# Patient Record
Sex: FEMALE | Race: WHITE | NOT HISPANIC OR LATINO | Employment: FULL TIME | URBAN - METROPOLITAN AREA
[De-identification: names, ages, dates, MRNs, and addresses within clinical notes are randomized per-mention and may not be internally consistent; named-entity substitution may affect disease eponyms.]

---

## 2017-01-24 ENCOUNTER — PATIENT MESSAGE (OUTPATIENT)
Dept: OBSTETRICS AND GYNECOLOGY | Facility: CLINIC | Age: 27
End: 2017-01-24

## 2017-01-25 ENCOUNTER — TELEPHONE (OUTPATIENT)
Dept: OBSTETRICS AND GYNECOLOGY | Facility: CLINIC | Age: 27
End: 2017-01-25

## 2017-01-25 NOTE — TELEPHONE ENCOUNTER
----- Message from Rosy Borja sent at 1/25/2017  9:49 AM CST -----  Contact: MIGUE WOODY [[id number]]  _x  1st Request  _  2nd Request  _  3rd Request        Who: MIGUE WOODY [[id number]]    Why: patient states she would like to schedule an appt for the vulva clinic for Tuesday    What Number to Call Back: [phone]    When to Expect a call back: (Before the end of the day)   -- if call after 3:00 call back will be tomorrow.

## 2017-02-02 ENCOUNTER — OFFICE VISIT (OUTPATIENT)
Dept: OBSTETRICS AND GYNECOLOGY | Facility: CLINIC | Age: 27
End: 2017-02-02
Attending: OBSTETRICS & GYNECOLOGY
Payer: COMMERCIAL

## 2017-02-02 VITALS
SYSTOLIC BLOOD PRESSURE: 110 MMHG | HEIGHT: 65 IN | DIASTOLIC BLOOD PRESSURE: 78 MMHG | WEIGHT: 146.38 LBS | BODY MASS INDEX: 24.39 KG/M2

## 2017-02-02 DIAGNOSIS — N89.8 VAGINAL DISCHARGE: ICD-10-CM

## 2017-02-02 DIAGNOSIS — Z12.4 SCREENING FOR CERVICAL CANCER: Primary | ICD-10-CM

## 2017-02-02 DIAGNOSIS — Z01.419 ENCOUNTER FOR GYNECOLOGICAL EXAMINATION WITHOUT ABNORMAL FINDING: ICD-10-CM

## 2017-02-02 LAB
CANDIDA RRNA VAG QL PROBE: POSITIVE
G VAGINALIS RRNA GENITAL QL PROBE: NEGATIVE
T VAGINALIS RRNA GENITAL QL PROBE: NEGATIVE

## 2017-02-02 PROCEDURE — 87480 CANDIDA DNA DIR PROBE: CPT

## 2017-02-02 PROCEDURE — 99999 PR PBB SHADOW E&M-EST. PATIENT-LVL II: CPT | Mod: PBBFAC,,, | Performed by: OBSTETRICS & GYNECOLOGY

## 2017-02-02 PROCEDURE — 99385 PREV VISIT NEW AGE 18-39: CPT | Mod: S$GLB,,, | Performed by: OBSTETRICS & GYNECOLOGY

## 2017-02-02 PROCEDURE — 88175 CYTOPATH C/V AUTO FLUID REDO: CPT

## 2017-02-02 RX ORDER — SERTRALINE HYDROCHLORIDE 100 MG/1
TABLET, FILM COATED ORAL
COMMUNITY
Start: 2016-12-16 | End: 2017-02-14 | Stop reason: SDUPTHER

## 2017-02-02 NOTE — PROGRESS NOTES
SUBJECTIVE:   26 y.o. female   for annual routine Pap and checkup. Patient's last menstrual period was 2017..  She complains of possible yeast infection. She is seeing Dr. Yogi SHIRLEY for vulvovestibulitis.        History reviewed. No pertinent past medical history.  History reviewed. No pertinent past surgical history.  Social History     Social History    Marital status: Single     Spouse name: N/A    Number of children: N/A    Years of education: N/A     Occupational History    Not on file.     Social History Main Topics    Smoking status: Never Smoker    Smokeless tobacco: Not on file    Alcohol use Yes      Comment: socially    Drug use: No    Sexual activity: Not Currently     Partners: Male     Birth control/ protection: None     Other Topics Concern    Not on file     Social History Narrative     Family History   Problem Relation Age of Onset    Ovarian cancer Paternal Grandmother     Breast cancer Maternal Grandmother     No Known Problems Father     No Known Problems Mother      OB History    Para Term  AB SAB TAB Ectopic Multiple Living   0 0 0 0 0 0 0 0 0 0               Current Outpatient Prescriptions   Medication Sig Dispense Refill    esomeprazole (NEXIUM) 20 MG capsule Take 20 mg by mouth before breakfast.      lorazepam (ATIVAN) 1 MG tablet Take 1 mg by mouth every 6 (six) hours as needed for Anxiety.      sertraline (ZOLOFT) 50 MG tablet Take 75 mg by mouth once daily.       sertraline (ZOLOFT) 100 MG tablet        No current facility-administered medications for this visit.      Allergies: Review of patient's allergies indicates no known allergies.     ROS:  Constitutional: no weight loss, weight gain, fever, fatigue  Eyes:  No vision changes, glasses/contacts  ENT/Mouth: No ulcers, sinus problems, ears ringing, headache  Cardiovascular: No inability to lie flat, chest pain, exercise intolerance, swelling, heart palpitations  Respiratory: No wheezing,  coughing blood, shortness of breath, or cough  Gastrointestinal: No diarrhea, bloody stool, nausea/vomiting, constipation, gas, hemorrhoids  Genitourinary: No blood in urine, painful urination, urgency of urination, frequency of urination, incomplete emptying, incontinence, abnormal bleeding, painful periods, heavy periods, vaginal discharge, vaginal odor, painful intercourse, sexual problems, bleeding after intercourse.  Musculoskeletal: No muscle weakness  Skin/Breast: No painful breasts, nipple discharge, masses, rash, ulcers  Neurological: No passing out, seizures, numbness, headache  Endocrine: No diabetes, hypothyroid, hyperthyroid, hot flashes, hair loss, abnormal hair growth, acne  Psychiatric: No depression, crying  Hematologic: No bruises, bleeding, swollen lymph nodes, anemia.      Physical Exam:   Constitutional: She is oriented to person, place, and time. She appears well-developed and well-nourished.      Neck: Normal range of motion. No tracheal deviation present. No thyromegaly present.    Cardiovascular: Exam reveals no edema.     Pulmonary/Chest: Effort normal. She exhibits no mass, no tenderness, no deformity and no retraction. Right breast exhibits no inverted nipple, no mass, no nipple discharge, no skin change, no tenderness, presence, no bleeding and no swelling. Left breast exhibits no inverted nipple, no mass, no nipple discharge, no skin change, no tenderness, presence, no bleeding and no swelling. Breasts are symmetrical.        Abdominal: Soft. She exhibits no distension and no mass. There is no tenderness. There is no rebound and no guarding. No hernia. Hernia confirmed negative in the left inguinal area.     Genitourinary: Vagina normal and uterus normal. Rectal exam shows no external hemorrhoid. There is no rash, tenderness or lesion on the right labia. There is no rash, tenderness or lesion on the left labia. Uterus is not deviated. Cervix is normal. No no adexnal prolapse. Right  adnexum displays no mass, no tenderness and no fullness. Left adnexum displays no mass, no tenderness and no fullness. No tenderness, bleeding, rectocele, cystocele or unspecified prolapse of vaginal walls in the vagina. No vaginal discharge found. Cervix exhibits no motion tenderness, no discharge and no friability.           Musculoskeletal: Normal range of motion and moves all extremeties. She exhibits no edema.      Lymphadenopathy:        Right: No inguinal adenopathy present.        Left: No inguinal adenopathy present.    Neurological: She is alert and oriented to person, place, and time.    Skin: No rash noted. No erythema. No pallor.    Psychiatric: She has a normal mood and affect. Her behavior is normal. Judgment and thought content normal.         ASSESSMENT:   well woman  Vaginal discharge    PLAN:   pap smear  additional lab tests per orders  return annually or prn  Patient's family history is suggestive of hereditary breast cancer secondary to a BRCA1/2 mutation. Options for genetic testing discussed in detail including possible test results, implications for self and family, and insurance issues. Patient to notify me if she desires genetic testing

## 2017-02-02 NOTE — MR AVS SNAPSHOT
"    [facility] [address]  [address]  [place] [address]  Phone: [phone]  Fax: [phone]                  Steffanie Payne   2017 8:30 AM   Office Visit    Description:  Female : 1990   Provider:  Shabana Cooper MD   Department:  [facility] [address]           Reason for Visit     Annual Exam                To Do List           Future Appointments        Provider Department Dept Phone    2017 4:00 PM Philip Calix III, MD [facility] [address] [phone]      Goals (5 Years of Data)     None      [facility]     [facility] -  Assistance  Registered nurses in the [facility] Center provide clinical advisement, health education, appointment booking, and other advisory services.  Call for this free service at [phone].             Medications           Message regarding Medications     Verify the changes and/or additions to your medication regime listed below are the same as discussed with your clinician today.  If any of these changes or additions are incorrect, please notify your healthcare provider.             Verify that the below list of medications is an accurate representation of the medications you are currently taking.  If none reported, the list may be blank. If incorrect, please contact your healthcare provider. Carry this list with you in case of emergency.           Current Medications     esomeprazole (NEXIUM) 20 MG capsule Take 20 mg by mouth before breakfast.    lorazepam (ATIVAN) 1 MG tablet Take 1 mg by mouth every 6 (six) hours as needed for Anxiety.    sertraline (ZOLOFT) 50 MG tablet Take 75 mg by mouth once daily.     sertraline (ZOLOFT) 100 MG tablet            Clinical Reference Information           Vital Signs - Last Recorded  Most recent update: 2017  8:39 AM by Miriam Best MA    BP Ht Wt LMP BMI    110/78 5' 5" (1.651 m) 66.4 kg (146 lb 6.2 oz) 2017 24.36 kg/m2    "   Blood Pressure          Most Recent Value    BP  110/78      Allergies as of 2/2/2017     No Known Allergies      Immunizations Administered on Date of Encounter - 2/2/2017     None

## 2017-02-03 ENCOUNTER — TELEPHONE (OUTPATIENT)
Dept: OBSTETRICS AND GYNECOLOGY | Facility: CLINIC | Age: 27
End: 2017-02-03

## 2017-02-03 RX ORDER — TERCONAZOLE 4 MG/G
1 CREAM VAGINAL DAILY
Qty: 45 G | Refills: 1 | Status: SHIPPED | OUTPATIENT
Start: 2017-02-03 | End: 2017-02-14 | Stop reason: ALTCHOICE

## 2017-02-14 ENCOUNTER — OFFICE VISIT (OUTPATIENT)
Dept: OBSTETRICS AND GYNECOLOGY | Facility: CLINIC | Age: 27
End: 2017-02-14
Attending: OBSTETRICS & GYNECOLOGY
Payer: COMMERCIAL

## 2017-02-14 ENCOUNTER — PATIENT MESSAGE (OUTPATIENT)
Dept: OBSTETRICS AND GYNECOLOGY | Facility: CLINIC | Age: 27
End: 2017-02-14

## 2017-02-14 VITALS
SYSTOLIC BLOOD PRESSURE: 112 MMHG | BODY MASS INDEX: 24.24 KG/M2 | WEIGHT: 145.5 LBS | HEIGHT: 65 IN | DIASTOLIC BLOOD PRESSURE: 70 MMHG

## 2017-02-14 DIAGNOSIS — B37.31 CANDIDIASIS OF VULVA AND VAGINA: ICD-10-CM

## 2017-02-14 DIAGNOSIS — N94.810 VESTIBULITIS, VULVAR: Primary | ICD-10-CM

## 2017-02-14 DIAGNOSIS — N94.10 DYSPAREUNIA IN FEMALE: ICD-10-CM

## 2017-02-14 PROCEDURE — 87210 SMEAR WET MOUNT SALINE/INK: CPT | Mod: QW,S$GLB,, | Performed by: OBSTETRICS & GYNECOLOGY

## 2017-02-14 PROCEDURE — 99214 OFFICE O/P EST MOD 30 MIN: CPT | Mod: S$GLB,,, | Performed by: OBSTETRICS & GYNECOLOGY

## 2017-02-14 PROCEDURE — 99999 PR PBB SHADOW E&M-EST. PATIENT-LVL III: CPT | Mod: PBBFAC,,, | Performed by: OBSTETRICS & GYNECOLOGY

## 2017-02-14 PROCEDURE — 87102 FUNGUS ISOLATION CULTURE: CPT

## 2017-02-14 NOTE — PROGRESS NOTES
"Subjective:     Patient ID: Steffanie Payne is a 26 y.o. female.     Chief Complaint: Vulvodynia (has questions about vaginal pH and positive "yeast" infection last week with gyn MD)     History of Present Illness: This patient is a 26 y.o. female, who presents to the GYN Vulva clinic for evaluation of vulvovaginal vestibulodynia and dyspareunia.  On February 2, 2017 the patient had an affirmed that was positive for candida, she was treated with Terazol.     Patient's last menstrual period was 01/20/2017.    Review of Systems    GENERAL: No fever, chills, fatigability or weightchange  SKIN: No rashes, itching or changes in color or texture of skin.  HEAD: No headaches or recent head trauma.  EYES: Visual acuity fine. No photophobia,r diplopia.  EARS: Denies earache or vertigo  NOSE: No loss of smell, no epistaxis or postnasal drip.  MOUTH & THROAT: No hoarseness or change in voice.   NODES: Denies swollen glands.  CHEST: Denies ROWE, cyanosis, wheezing, cough and sputum production.  CARDIOVASCULAR: Denies chest pain, PND, orthopnea or reduced exercise tolerance.  ABDOMEN: Appetite fine. No weight loss. bloating, Denies diarrhea, abdominal pain, hematemesis or blood in stool.  URINARY: No flank pain, dysuria or hematuria.  PERIPHERAL VASCULAR: No claudication or cyanosis.Varicosities  MUSCULOSKELETAL: No joint stiffness or swelling. Denies back pain.muscle aches  NEUROLOGIC: No history of seizures, paralysis, alteration of gait or coordination.       Objective:       Physical Exam     APPEARANCE: Well nourished, well developed, in no acute distress.    GENITOURINARY:  Vulva: No lesions. Normal female genital architecture. Q-Tip test indicates discomfort in the vestibule on the left side in the periurethral area around 1:00, minimal discomfort noted in the rest of the vestibule  Urethral Meatus: Normal size and location, no lesions, no prolapse.  Urethra: No masses, tenderness, prolapse or scarring.  Vagina: Moist with " rugae, no discharge, no significant cystocele or rectocele.  Cervix: No lesions, normal diameter, no stenosis, no cervical motion tenderness. .  Uterus: 6 week size, regular shape, mobile, non-tender, normal position, good support.  Adnexa: No masses, tenderness or CDS nodularity.  Anus Perineum: No lesions, no relaxation, no external hemorrhoids.  Abdomen: No masses, tenderness, hernia or ascites, no hepatasplenomegaly  Skin: No rashes, lesions, ulcers, acne, hirsutism.  Peripheral/lower extremities: No edema, erythema or tenderness.  Lymphatic: No axillary, neck or groin nodes palp.  Mental Status: Alert, oriented x 3, normal affect and mood.          @PROCEDURE:@  Wet Prep:  pH = 4.0  -WBCS = occasional  -Lactobacilli = present  -BV = Amsel negative  -Candida = Hyphae none seen  -Trichomnas = none seen  -Cells- Basal and Parabasal, Superficial: Maturation: Superficial cells predominate  -Impression: Negative wet prep  -Treatment: No therapy indicated  -RTC Vulva Clinic when necessary          Assessment:      1. Vestibulitis, vulvar    2. Candidiasis of vulva and vagina               Plan:  1.  Candida culture taken  2.  Return to clinic as needed.                Orders Placed This Encounter   Procedures    Fungus culture

## 2017-03-07 ENCOUNTER — PATIENT MESSAGE (OUTPATIENT)
Dept: OBSTETRICS AND GYNECOLOGY | Facility: CLINIC | Age: 27
End: 2017-03-07

## 2017-03-20 LAB — FUNGUS SPEC CULT: NORMAL

## 2017-04-22 ENCOUNTER — PATIENT MESSAGE (OUTPATIENT)
Dept: OBSTETRICS AND GYNECOLOGY | Facility: CLINIC | Age: 27
End: 2017-04-22

## 2017-05-16 ENCOUNTER — PATIENT MESSAGE (OUTPATIENT)
Dept: OBSTETRICS AND GYNECOLOGY | Facility: CLINIC | Age: 27
End: 2017-05-16

## 2017-09-11 ENCOUNTER — PATIENT MESSAGE (OUTPATIENT)
Dept: OBSTETRICS AND GYNECOLOGY | Facility: CLINIC | Age: 27
End: 2017-09-11

## 2017-10-02 ENCOUNTER — OFFICE VISIT (OUTPATIENT)
Dept: OBSTETRICS AND GYNECOLOGY | Facility: CLINIC | Age: 27
End: 2017-10-02
Attending: OBSTETRICS & GYNECOLOGY
Payer: COMMERCIAL

## 2017-10-02 VITALS
WEIGHT: 148.81 LBS | BODY MASS INDEX: 24.79 KG/M2 | SYSTOLIC BLOOD PRESSURE: 104 MMHG | DIASTOLIC BLOOD PRESSURE: 60 MMHG | HEIGHT: 65 IN

## 2017-10-02 DIAGNOSIS — N94.810 VULVAR VESTIBULITIS: ICD-10-CM

## 2017-10-02 DIAGNOSIS — Z30.9 ENCOUNTER FOR CONTRACEPTIVE MANAGEMENT, UNSPECIFIED TYPE: Primary | ICD-10-CM

## 2017-10-02 PROCEDURE — 99213 OFFICE O/P EST LOW 20 MIN: CPT | Mod: S$GLB,,, | Performed by: OBSTETRICS & GYNECOLOGY

## 2017-10-02 RX ORDER — SERTRALINE HYDROCHLORIDE 100 MG/1
TABLET, FILM COATED ORAL
COMMUNITY
Start: 2017-07-21

## 2017-10-07 NOTE — PROGRESS NOTES
"SUBJECTIVE:   27 y.o. female  presents to discuss birth control. . Patient's last menstrual period was 09/10/2017 (exact date)..  She reports that "I don't think the pill is the best option for me. " She wants to discuss other options. She has a history of vulvar vestibulitis. .    She denies acne    History reviewed. No pertinent past medical history.  History reviewed. No pertinent surgical history.  Social History     Social History    Marital status: Single     Spouse name: N/A    Number of children: N/A    Years of education: N/A     Occupational History    Not on file.     Social History Main Topics    Smoking status: Never Smoker    Smokeless tobacco: Never Used    Alcohol use Yes      Comment: socially    Drug use: No    Sexual activity: Yes     Partners: Male     Birth control/ protection: None     Other Topics Concern    Not on file     Social History Narrative    No narrative on file     Family History   Problem Relation Age of Onset    Ovarian cancer Paternal Grandmother 70    Breast cancer Maternal Grandmother 70    No Known Problems Father     No Known Problems Mother     Cancer Neg Hx     Colon cancer Neg Hx      OB History    Para Term  AB Living   0 0 0 0 0 0   SAB TAB Ectopic Multiple Live Births   0 0 0 0                 Current Outpatient Prescriptions   Medication Sig Dispense Refill    esomeprazole (NEXIUM) 20 MG capsule Take 20 mg by mouth before breakfast.      lorazepam (ATIVAN) 1 MG tablet Take 1 mg by mouth every 6 (six) hours as needed for Anxiety.      sertraline (ZOLOFT) 100 MG tablet        No current facility-administered medications for this visit.      Allergies: Review of patient's allergies indicates no known allergies.     ROS:  Constitutional: no weight loss, weight gain, fever, fatigue  Eyes:  No vision changes, glasses/contacts    Gastrointestinal: No diarrhea, bloody stool, nausea/vomiting, constipation, gas, " hemorrhoids  Genitourinary: No blood in urine, painful urination, urgency of urination, frequency of urination, incomplete emptying, incontinence, abnormal bleeding, painful periods, heavy periods, vaginal discharge, vaginal odor, painful intercourse, sexual problems, bleeding after intercourse, +vaginal pain.  Musculoskeletal: No muscle weakness  Skin/Breast: No painful breasts, nipple discharge, masses, rash, ulcers  Neurological: No passing out, seizures, numbness, headache  Endocrine: No diabetes, hypothyroid, hyperthyroid, hot flashes, hair loss, abnormal hair growth, acne  Psychiatric: No depression, crying  Hematologic: No bruises, bleeding, swollen lymph nodes, anemia.      Physical Exam  deferred    ASSESSMENT:   Contraception  Vulvar vestibulitis    PLAN:   Counseled patient on birth control options. Discussed LARC- with her history of vulvar vestibulitis- IUD may not be best option. Discussed risks/benefits of Nexplanon- she desires to try

## 2017-10-12 ENCOUNTER — TELEPHONE (OUTPATIENT)
Dept: OBSTETRICS AND GYNECOLOGY | Facility: CLINIC | Age: 27
End: 2017-10-12

## 2017-10-12 NOTE — TELEPHONE ENCOUNTER
Called to inform patient that she has been approved for hNexplanon 100% trough [employer]. Patient stated that she was at work and would call the office back to schedule.

## 2018-01-23 ENCOUNTER — TELEPHONE (OUTPATIENT)
Dept: OBSTETRICS AND GYNECOLOGY | Facility: CLINIC | Age: 28
End: 2018-01-23

## 2018-01-23 ENCOUNTER — OFFICE VISIT (OUTPATIENT)
Dept: OBSTETRICS AND GYNECOLOGY | Facility: CLINIC | Age: 28
End: 2018-01-23
Payer: COMMERCIAL

## 2018-01-23 VITALS
WEIGHT: 143.06 LBS | SYSTOLIC BLOOD PRESSURE: 110 MMHG | HEIGHT: 65 IN | BODY MASS INDEX: 23.83 KG/M2 | DIASTOLIC BLOOD PRESSURE: 70 MMHG

## 2018-01-23 DIAGNOSIS — N76.0 ACUTE VAGINITIS: Primary | ICD-10-CM

## 2018-01-23 LAB
CANDIDA RRNA VAG QL PROBE: NEGATIVE
G VAGINALIS RRNA GENITAL QL PROBE: NEGATIVE
T VAGINALIS RRNA GENITAL QL PROBE: NEGATIVE

## 2018-01-23 PROCEDURE — 99214 OFFICE O/P EST MOD 30 MIN: CPT | Mod: S$GLB,,, | Performed by: ADVANCED PRACTICE MIDWIFE

## 2018-01-23 PROCEDURE — 99999 PR PBB SHADOW E&M-EST. PATIENT-LVL III: CPT | Mod: PBBFAC,,, | Performed by: ADVANCED PRACTICE MIDWIFE

## 2018-01-23 PROCEDURE — 87480 CANDIDA DNA DIR PROBE: CPT

## 2018-01-23 RX ORDER — TINIDAZOLE 500 MG/1
2 TABLET ORAL
Qty: 8 TABLET | Refills: 0 | Status: SHIPPED | OUTPATIENT
Start: 2018-01-23 | End: 2018-01-25

## 2018-01-23 RX ORDER — FLUCONAZOLE 200 MG/1
200 TABLET ORAL EVERY OTHER DAY
Qty: 3 TABLET | Refills: 0 | Status: SHIPPED | OUTPATIENT
Start: 2018-01-23 | End: 2018-01-28

## 2018-01-24 NOTE — TELEPHONE ENCOUNTER
Spoke with pt per phone- discussed negative culture but sec to symptoms have sent in rx for meds. Instructions given.

## 2018-01-24 NOTE — PROGRESS NOTES
"Steffanie Payne is a 27 y.o. female  p[facility] with complaint of vaginal irritation and swelling x 1 day. Pt reports vaginal odor x 1 week.  She denies itching.  She states the discharge is normal. Pt reports hx vulvar vestibilitis and was treated per Dr. Yogi SHIRLEY and reports these symptoms are not similar to the symptoms she had 3 yrs ago. Pt denies painful intercourse.     Pt sees Dr. Cooper for her OB/GYN care.    ROS:  GENERAL: No fever, chills, fatigability or weight loss.  VULVAR: No pain, no lesions and no itching.  VAGINAL: No relaxation, no abnormal bleeding and no lesions. Reports vaginal swelling and burning sensation.   ABDOMEN: No abdominal pain. Denies nausea. Denies vomiting. No diarrhea. No constipation  BREAST: Denies pain. No lumps. No discharge.  URINARY: No incontinence, no nocturia, no frequency and no dysuria.  CARDIOVASCULAR: No chest pain. No shortness of breath. No leg cramps.  NEUROLOGICAL: No headaches. No vision changes.      Review of patient's allergies indicates:  No Known Allergies    Current Outpatient Prescriptions:     esomeprazole (NEXIUM) 20 MG capsule, Take 20 mg by mouth before breakfast., Disp: , Rfl:     lorazepam (ATIVAN) 1 MG tablet, Take 1 mg by mouth every 6 (six) hours as needed for Anxiety., Disp: , Rfl:     sertraline (ZOLOFT) 100 MG tablet, , Disp: , Rfl:     History reviewed. No pertinent past medical history.  History reviewed. No pertinent surgical history.  Social History   Substance Use Topics    Smoking status: Never Smoker    Smokeless tobacco: Never Used    Alcohol use Yes      Comment: socially     OB History    Para Term  AB Living   0 0 0 0 0 0   SAB TAB Ectopic Multiple Live Births   0 0 0 0               /70   Ht 5' 5" (1.651 m)   Wt 64.9 kg (143 lb 1.3 oz)   LMP 2018 (Exact Date)   BMI 23.81 kg/m²     PHYSICAL EXAM:  GENERAL: Calm and appropriate affect, alert, oriented x4  SKIN: Color " appropriate for race, warm and dry, clean and intact with no rashes.  RESP: Even, unlabored breathing  ABDOMEN: Soft, nontender, no masses.  :   Normal external female genitalia without lesions. Normal hair distribution. Adequate perineal body, normal urethral meatus.  Vagina pink and well rugated, no lesions, vaginal discharge - minimal with no odor  No significant cystocele or rectocele.  Cervix pink without discharge or lesions, no cervical motion tenderness.  Uterus 4-6 weeks size, regular, mobile and nontender.  Adnexa: normal adnexa in size, nontender and no masses      ASSESSMENT / PLAN:    ICD-10-CM ICD-9-CM    1. Acute vaginitis N76.0 616.10 Vaginosis Screen by DNA Probe     Acute vaginitis  -     Vaginosis Screen by DNA Probe          Patient was counseled today on vaginitis prevention including :  a. avoiding feminine products such as deoderant soaps, body wash, bubble bath, douches, scented toilet paper, deoderant tampons or pads, feminine wipes, chronic pad use, etc.  b. avoiding other vulvovaginal irritants such as long hot baths, humidity, tight, synthetic clothing, chlorine and sitting around in wet bathing suits  c. wearing cotton underwear, avoiding thong underwear and no underwear to bed  d. taking showers instead of baths and use a hair dryer on cool setting afterwards to dry  e. wearing cotton to exercise and shower immediately after exercise and change clothes  f. using polyurethane condoms without spermicide if sexually active and symptoms are triggered by intercourse  g. Discussed use of vagisil, along with repHresh and probiotics    FOLLOW UP:   Pending lab results, PRN lack of improvement.

## 2018-03-27 ENCOUNTER — TELEPHONE (OUTPATIENT)
Dept: OBSTETRICS AND GYNECOLOGY | Facility: CLINIC | Age: 28
End: 2018-03-27

## 2018-04-06 ENCOUNTER — TELEPHONE (OUTPATIENT)
Dept: OBSTETRICS AND GYNECOLOGY | Facility: CLINIC | Age: 28
End: 2018-04-06

## 2018-04-06 ENCOUNTER — PATIENT MESSAGE (OUTPATIENT)
Dept: OBSTETRICS AND GYNECOLOGY | Facility: CLINIC | Age: 28
End: 2018-04-06

## 2018-04-06 NOTE — TELEPHONE ENCOUNTER
Pt states she is out of town in [facility] and having sever vaginal itching. pt states it started after her boyfriend came into town. Pt states the last time she had this she was given a dilfucan and it helped. Pt states since she is out of town would viola a diflucan. Advised pt will call in a diflucan but if not better will check her at her appointment on 4/20

## 2018-04-20 ENCOUNTER — OFFICE VISIT (OUTPATIENT)
Dept: OBSTETRICS AND GYNECOLOGY | Facility: CLINIC | Age: 28
End: 2018-04-20
Attending: OBSTETRICS & GYNECOLOGY
Payer: COMMERCIAL

## 2018-04-20 VITALS
DIASTOLIC BLOOD PRESSURE: 60 MMHG | WEIGHT: 149.25 LBS | HEIGHT: 66 IN | SYSTOLIC BLOOD PRESSURE: 118 MMHG | BODY MASS INDEX: 23.99 KG/M2

## 2018-04-20 DIAGNOSIS — N89.8 VAGINAL DISCHARGE: ICD-10-CM

## 2018-04-20 DIAGNOSIS — N76.0 BV (BACTERIAL VAGINOSIS): Primary | ICD-10-CM

## 2018-04-20 DIAGNOSIS — B96.89 BV (BACTERIAL VAGINOSIS): Primary | ICD-10-CM

## 2018-04-20 DIAGNOSIS — Z01.419 ENCOUNTER FOR GYNECOLOGICAL EXAMINATION WITHOUT ABNORMAL FINDING: ICD-10-CM

## 2018-04-20 PROCEDURE — 87510 GARDNER VAG DNA DIR PROBE: CPT

## 2018-04-20 PROCEDURE — 87480 CANDIDA DNA DIR PROBE: CPT

## 2018-04-20 PROCEDURE — 99395 PREV VISIT EST AGE 18-39: CPT | Mod: S$GLB,,, | Performed by: OBSTETRICS & GYNECOLOGY

## 2018-04-20 NOTE — PROGRESS NOTES
"SUBJECTIVE:   27 y.o. female   for annual routine Pap and checkup. Patient's last menstrual period was 2018..  She has a history of vulvar vestibulitis- she has seen Dr. Calix in the past. She reports that her boyfriend lives in [place] and the last 2 times she has had intercourse , she has gotten a "yeast infection". She reports treating it with Diflucan and it got better. She reports  Her pain is to touch but she is able to use tampons. .        History reviewed. No pertinent past medical history.  History reviewed. No pertinent surgical history.  Social History     Social History    Marital status: Single     Spouse name: N/A    Number of children: N/A    Years of education: N/A     Occupational History    Not on file.     Social History Main Topics    Smoking status: Never Smoker    Smokeless tobacco: Never Used    Alcohol use Yes      Comment: socially    Drug use: No    Sexual activity: Yes     Partners: Male     Birth control/ protection: Condom     Other Topics Concern    Not on file     Social History Narrative    No narrative on file     Family History   Problem Relation Age of Onset    Ovarian cancer Paternal Grandmother 70    Breast cancer Maternal Grandmother 70    No Known Problems Father     No Known Problems Mother     Cancer Neg Hx     Colon cancer Neg Hx      OB History    Para Term  AB Living   0 0 0 0 0 0   SAB TAB Ectopic Multiple Live Births   0 0 0 0                   Current Outpatient Prescriptions   Medication Sig Dispense Refill    esomeprazole (NEXIUM) 20 MG capsule Take 20 mg by mouth before breakfast.      lorazepam (ATIVAN) 1 MG tablet Take 1 mg by mouth every 6 (six) hours as needed for Anxiety.      sertraline (ZOLOFT) 100 MG tablet        No current facility-administered medications for this visit.      Allergies: Patient has no known allergies.     The ASCVD Risk score (Ravinder DC Jr., et al., 2013) failed to calculate for the following " reasons:    The 2013 ASCVD risk score is only valid for ages 40 to 79      ROS:  Constitutional: no weight loss, weight gain, fever, fatigue  Eyes:  No vision changes, glasses/contacts  ENT/Mouth: No ulcers, sinus problems, ears ringing, headache  Cardiovascular: No inability to lie flat, chest pain, exercise intolerance, swelling, heart palpitations  Respiratory: No wheezing, coughing blood, shortness of breath, or cough  Gastrointestinal: No diarrhea, bloody stool, nausea/vomiting, constipation, gas, hemorrhoids  Genitourinary: No blood in urine, painful urination, urgency of urination, frequency of urination, incomplete emptying, incontinence, abnormal bleeding, painful periods, heavy periods, vaginal discharge, vaginal odor, painful intercourse, sexual problems, bleeding after intercourse.  Musculoskeletal: No muscle weakness  Skin/Breast: No painful breasts, nipple discharge, masses, rash, ulcers  Neurological: No passing out, seizures, numbness, headache  Endocrine: No diabetes, hypothyroid, hyperthyroid, hot flashes, hair loss, abnormal hair growth, acne  Psychiatric: No depression, crying  Hematologic: No bruises, bleeding, swollen lymph nodes, anemia.      Physical Exam:   Constitutional: She is oriented to person, place, and time. She appears well-developed and well-nourished.      Neck: Normal range of motion. No tracheal deviation present. No thyromegaly present.    Cardiovascular: Exam reveals no edema.     Pulmonary/Chest: Effort normal. She exhibits no mass, no tenderness, no deformity and no retraction. Right breast exhibits no inverted nipple, no mass, no nipple discharge, no skin change, no tenderness, presence, no bleeding and no swelling. Left breast exhibits no inverted nipple, no mass, no nipple discharge, no skin change, no tenderness, presence, no bleeding and no swelling. Breasts are symmetrical.        Abdominal: Soft. She exhibits no distension and no mass. There is no tenderness. There is  no rebound and no guarding. No hernia. Hernia confirmed negative in the left inguinal area.     Genitourinary: Vagina normal and uterus normal. Rectal exam shows no external hemorrhoid. There is no rash, tenderness or lesion on the right labia. There is no rash, tenderness or lesion on the left labia. Uterus is not deviated. Cervix is normal. No no adexnal prolapse. Right adnexum displays no mass, no tenderness and no fullness. Left adnexum displays no mass, no tenderness and no fullness. No tenderness, bleeding, rectocele, cystocele or unspecified prolapse of vaginal walls in the vagina. No vaginal discharge found. Cervix exhibits no motion tenderness, no discharge and no friability.           Musculoskeletal: Normal range of motion and moves all extremeties. She exhibits no edema.      Lymphadenopathy:        Right: No inguinal adenopathy present.        Left: No inguinal adenopathy present.    Neurological: She is alert and oriented to person, place, and time.    Skin: No rash noted. No erythema. No pallor.    Psychiatric: She has a normal mood and affect. Her behavior is normal. Judgment and thought content normal.       +vaginal discharge  ASSESSMENT:   well woman  Vulva vestibulitis  PLAN:   additional lab tests per orders  Counseled patient on vaginitis- recommend boric acid prior to trips to [place]- may help keep vaginal pH in check  Recommend [place] for lubricant    return annually or prn

## 2018-04-23 LAB
CANDIDA RRNA VAG QL PROBE: NEGATIVE
G VAGINALIS RRNA GENITAL QL PROBE: POSITIVE
T VAGINALIS RRNA GENITAL QL PROBE: NEGATIVE

## 2018-04-24 ENCOUNTER — TELEPHONE (OUTPATIENT)
Dept: OBSTETRICS AND GYNECOLOGY | Facility: CLINIC | Age: 28
End: 2018-04-24

## 2018-04-26 ENCOUNTER — PATIENT MESSAGE (OUTPATIENT)
Dept: OBSTETRICS AND GYNECOLOGY | Facility: CLINIC | Age: 28
End: 2018-04-26

## 2018-04-26 ENCOUNTER — TELEPHONE (OUTPATIENT)
Dept: OBSTETRICS AND GYNECOLOGY | Facility: CLINIC | Age: 28
End: 2018-04-26

## 2018-04-26 DIAGNOSIS — Z80.3 FAMILY HISTORY OF BREAST CANCER: Primary | ICD-10-CM

## 2018-04-26 DIAGNOSIS — Z80.41 FAMILY HISTORY OF OVARIAN CANCER: ICD-10-CM

## 2018-04-26 NOTE — TELEPHONE ENCOUNTER
Called Steffanie Payne after receiving [employer] pedigree and consult note that states that we should move forward with genetic testing.  Left message.  Awaiting return call.

## 2018-04-27 ENCOUNTER — TELEPHONE (OUTPATIENT)
Dept: OBSTETRICS AND GYNECOLOGY | Facility: CLINIC | Age: 28
End: 2018-04-27

## 2018-04-27 NOTE — TELEPHONE ENCOUNTER
----- Message from Kaleb Harley sent at 4/27/2018 11:51 AM CDT -----  Please call pharmacy regarding pt [email or web address] [phone]

## 2018-04-30 ENCOUNTER — TELEPHONE (OUTPATIENT)
Dept: OBSTETRICS AND GYNECOLOGY | Facility: CLINIC | Age: 28
End: 2018-04-30

## 2018-04-30 NOTE — TELEPHONE ENCOUNTER
----- Message from Juan Alberto Richardson sent at 4/30/2018  2:58 PM CDT -----  Contact: MIGUE WOODY [[id number]]    Name of Who is Calling: MIGUE WOODY [[id number]]      What is the request in detail: Patient would like to speak with staff in regards to getting an alternative from Clindesse      Can the clinic reply by [facility]: no      What Number to Call Back if not in [facility]: [phone]

## 2018-04-30 NOTE — TELEPHONE ENCOUNTER
Pt states the pharmacy can not get the clindesse in and would like something else called in. Advised pt will call in metrogel vaginal. Pt verbalized understanding

## 2018-05-01 ENCOUNTER — LAB VISIT (OUTPATIENT)
Dept: LAB | Facility: OTHER | Age: 28
End: 2018-05-01
Attending: OBSTETRICS & GYNECOLOGY
Payer: COMMERCIAL

## 2018-05-01 DIAGNOSIS — Z80.41 FAMILY HISTORY OF OVARIAN CANCER: ICD-10-CM

## 2018-05-01 DIAGNOSIS — Z80.3 FAMILY HISTORY OF BREAST CANCER: ICD-10-CM

## 2018-05-01 PROCEDURE — 36415 COLL VENOUS BLD VENIPUNCTURE: CPT

## 2018-05-08 ENCOUNTER — TELEPHONE (OUTPATIENT)
Dept: OBSTETRICS AND GYNECOLOGY | Facility: CLINIC | Age: 28
End: 2018-05-08

## 2018-05-15 ENCOUNTER — PATIENT MESSAGE (OUTPATIENT)
Dept: OBSTETRICS AND GYNECOLOGY | Facility: CLINIC | Age: 28
End: 2018-05-15

## 2018-05-16 DIAGNOSIS — N76.0 BV (BACTERIAL VAGINOSIS): ICD-10-CM

## 2018-05-16 DIAGNOSIS — B96.89 BV (BACTERIAL VAGINOSIS): ICD-10-CM

## 2018-05-24 LAB — INTEGRATED BRAC ANALYSIS: NORMAL

## 2018-06-13 ENCOUNTER — PATIENT MESSAGE (OUTPATIENT)
Dept: OBSTETRICS AND GYNECOLOGY | Facility: CLINIC | Age: 28
End: 2018-06-13

## 2018-06-14 ENCOUNTER — OFFICE VISIT (OUTPATIENT)
Dept: OBSTETRICS AND GYNECOLOGY | Facility: CLINIC | Age: 28
End: 2018-06-14
Attending: OBSTETRICS & GYNECOLOGY
Payer: COMMERCIAL

## 2018-06-14 VITALS
DIASTOLIC BLOOD PRESSURE: 68 MMHG | BODY MASS INDEX: 24.17 KG/M2 | HEIGHT: 65 IN | WEIGHT: 145.06 LBS | SYSTOLIC BLOOD PRESSURE: 114 MMHG

## 2018-06-14 DIAGNOSIS — N89.8 VAGINAL DISCHARGE: Primary | ICD-10-CM

## 2018-06-14 PROCEDURE — 87480 CANDIDA DNA DIR PROBE: CPT

## 2018-06-14 PROCEDURE — 99214 OFFICE O/P EST MOD 30 MIN: CPT | Mod: S$GLB,,, | Performed by: OBSTETRICS & GYNECOLOGY

## 2018-06-14 PROCEDURE — 3008F BODY MASS INDEX DOCD: CPT | Mod: CPTII,S$GLB,, | Performed by: OBSTETRICS & GYNECOLOGY

## 2018-06-14 PROCEDURE — 87510 GARDNER VAG DNA DIR PROBE: CPT

## 2018-06-14 PROCEDURE — 87102 FUNGUS ISOLATION CULTURE: CPT

## 2018-06-14 NOTE — PROGRESS NOTES
SUBJECTIVE:   27 y.o. female  presents complaining of recurrent yeast infection. . Patient's last menstrual period was 2018..  She reports treating recurrent yeast with Diflucan and felt like boric acid helped. She thought the yeast was associated with infrequent intercourse but now she associates it with her period. She reports having discharge that started recently and occasional discomfort. She uses RenuLife Probiotic 90 billion- from [employer]. She also is taking MonoLauren. .        History reviewed. No pertinent past medical history.  History reviewed. No pertinent surgical history.  Social History     Social History    Marital status: Single     Spouse name: N/A    Number of children: N/A    Years of education: N/A     Occupational History    Not on file.     Social History Main Topics    Smoking status: Never Smoker    Smokeless tobacco: Never Used    Alcohol use Yes      Comment: socially    Drug use: No    Sexual activity: Yes     Partners: Male     Birth control/ protection: Condom     Other Topics Concern    Not on file     Social History Narrative    No narrative on file     Family History   Problem Relation Age of Onset    Ovarian cancer Paternal Grandmother 70    Breast cancer Maternal Grandmother 70    No Known Problems Father     No Known Problems Mother     Cancer Neg Hx     Colon cancer Neg Hx      OB History    Para Term  AB Living   0 0 0 0 0 0   SAB TAB Ectopic Multiple Live Births   0 0 0 0                   Current Outpatient Prescriptions   Medication Sig Dispense Refill    boric acid (BORIC ACID) vaginal suppository INSERT ONE SUPPOSITORY VAGINALLY EVERY NIGHT AT BEDTIME 14 suppository 3    esomeprazole (NEXIUM) 20 MG capsule Take 20 mg by mouth before breakfast.      lorazepam (ATIVAN) 1 MG tablet Take 1 mg by mouth every 6 (six) hours as needed for Anxiety.      sertraline (ZOLOFT) 100 MG tablet        No current facility-administered  medications for this visit.      Allergies: Patient has no known allergies.     The ASCVD Risk score (Ravinder RUDY Berry., et al., 2013) failed to calculate for the following reasons:    The 2013 ASCVD risk score is only valid for ages 40 to 79      ROS:  Constitutional: no weight loss, weight gain, fever, fatigue    Gastrointestinal: No diarrhea, bloody stool, nausea/vomiting, constipation, gas, hemorrhoids  Genitourinary: No blood in urine, painful urination, urgency of urination, frequency of urination, incomplete emptying, incontinence, abnormal bleeding, painful periods, heavy periods,+ vaginal discharge, vaginal odor, painful intercourse, sexual problems, bleeding after intercourse.  Musculoskeletal: No muscle weakness  Skin/Breast: No painful breasts, nipple discharge, masses, rash, ulcers  Neurological: No passing out, seizures, numbness, headache  Endocrine: No diabetes, hypothyroid, hyperthyroid, hot flashes, hair loss, abnormal hair growth, acne  Psychiatric: No depression, crying  Hematologic: No bruises, bleeding, swollen lymph nodes, anemia.      Physical Exam  General- well developed, well nourished  Vulva- no masses, no lesions  Vagina-  no masses, no lesions, +discharge  Cervix- no Cervical motion tenderness, no lesions  Uterus- normal size, nontender  Adnexa- nontender, no masses      ASSESSMENT: Vaginal discharge  Irritation  PLAN:   Follow up affirm  Follow up culture  Continue Boric acid

## 2018-06-15 ENCOUNTER — TELEPHONE (OUTPATIENT)
Dept: OBSTETRICS AND GYNECOLOGY | Facility: CLINIC | Age: 28
End: 2018-06-15

## 2018-06-15 RX ORDER — METRONIDAZOLE 7.5 MG/G
1 GEL VAGINAL DAILY
Qty: 70 G | Refills: 1 | Status: SHIPPED | OUTPATIENT
Start: 2018-06-15 | End: 2018-06-20

## 2018-07-17 LAB — FUNGUS SPEC CULT: NORMAL

## 2018-08-14 ENCOUNTER — OFFICE VISIT (OUTPATIENT)
Dept: OBSTETRICS AND GYNECOLOGY | Facility: CLINIC | Age: 28
End: 2018-08-14
Payer: COMMERCIAL

## 2018-08-14 ENCOUNTER — PATIENT MESSAGE (OUTPATIENT)
Dept: OBSTETRICS AND GYNECOLOGY | Facility: CLINIC | Age: 28
End: 2018-08-14

## 2018-08-14 VITALS
SYSTOLIC BLOOD PRESSURE: 112 MMHG | BODY MASS INDEX: 24.75 KG/M2 | WEIGHT: 148.56 LBS | DIASTOLIC BLOOD PRESSURE: 74 MMHG | HEIGHT: 65 IN

## 2018-08-14 DIAGNOSIS — N76.0 ACUTE VAGINITIS: Primary | ICD-10-CM

## 2018-08-14 PROCEDURE — 99999 PR PBB SHADOW E&M-EST. PATIENT-LVL III: CPT | Mod: PBBFAC,,, | Performed by: NURSE PRACTITIONER

## 2018-08-14 PROCEDURE — 87660 TRICHOMONAS VAGIN DIR PROBE: CPT

## 2018-08-14 PROCEDURE — 99213 OFFICE O/P EST LOW 20 MIN: CPT | Mod: S$GLB,,, | Performed by: NURSE PRACTITIONER

## 2018-08-14 PROCEDURE — 3008F BODY MASS INDEX DOCD: CPT | Mod: CPTII,S$GLB,, | Performed by: NURSE PRACTITIONER

## 2018-08-14 NOTE — PROGRESS NOTES
"Steffanie Payne is a 28 y.o. female  presents with complaint of vaginal discharge and itching x 2 days. She has been having BV intermittently since January. She will use coconut oil prn and boric acid suppositories. Personal hx of vulvar vestibulitis. Thought the Metrogel worked better than the oral Flagyl. Prefers to wait for results prior to treating. She sees Dr. Cooper for her GYN care.     No past medical history on file.  No past surgical history on file.  Social History     Tobacco Use    Smoking status: Never Smoker    Smokeless tobacco: Never Used   Substance Use Topics    Alcohol use: Yes     Comment: socially    Drug use: No     Family History   Problem Relation Age of Onset    Ovarian cancer Paternal Grandmother 70    Breast cancer Maternal Grandmother 70    No Known Problems Father     No Known Problems Mother     Cancer Neg Hx     Colon cancer Neg Hx      OB History    Para Term  AB Living   0 0 0 0 0 0   SAB TAB Ectopic Multiple Live Births   0 0 0 0               /74   Ht 5' 5" (1.651 m)   Wt 67.4 kg (148 lb 9.4 oz)   LMP 2018   BMI 24.73 kg/m²     ROS:  GENERAL: No fever, chills, fatigability or weight loss.  VULVAR: No pain, no lesions and no itching.  VAGINAL: No relaxation, + itching, + discharge, no abnormal bleeding and no lesions.  ABDOMEN: No abdominal pain. Denies nausea. Denies vomiting. No diarrhea. No constipation  BREAST: Denies pain. No lumps. No discharge.  URINARY: No incontinence, no nocturia, no frequency and no dysuria.  CARDIOVASCULAR: No chest pain. No shortness of breath. No leg cramps.  NEUROLOGICAL: No headaches. No vision changes.    PHYSICAL EXAM:  VULVA: normal appearing vulva with no masses, tenderness or lesions   VAGINA: normal appearing vagina with normal color. Small amount of white discharge, no lesions   CERVIX: normal appearing cervix without discharge or lesions   UTERUS: uterus is normal size, shape, consistency and " nontender   ADNEXA: normal adnexa in size, nontender and no masses    ASSESSMENT and PLAN:    ICD-10-CM ICD-9-CM    1. Acute vaginitis N76.0 616.10 Vaginosis Screen by DNA Probe     Affirm pending-Metrogel if BV    Patient was counseled today on vaginitis prevention including :  a. avoiding feminine products such as deoderant soaps, body wash, bubble bath, douches, scented toilet paper, deoderant tampons or pads, feminine wipes, chronic pad use, etc.  b. avoiding other vulvovaginal irritants such as long hot baths, humidity, tight, synthetic clothing, chlorine and sitting around in wet bathing suits  c. wearing cotton underwear, avoiding thong underwear and no underwear to bed  d. taking showers instead of baths and use a hair dryer on cool setting afterwards to dry  e. wearing cotton to exercise and shower immediately after exercise and change clothes  f. using polyurethane condoms without spermicide if sexually active and symptoms are triggered by intercourse    FOLLOW UP: PRN lack of improvement.

## 2018-08-15 RX ORDER — FLUCONAZOLE 200 MG/1
200 TABLET ORAL ONCE
Qty: 1 TABLET | Refills: 0 | Status: SHIPPED | OUTPATIENT
Start: 2018-08-15 | End: 2018-08-15

## 2018-11-28 NOTE — TELEPHONE ENCOUNTER
Scheduled lab draw for tomorrow after speaking with Steffanie.  She was instructed to come to our office first and get her kit as well as sign her paperwork.  
no

## 2019-01-09 ENCOUNTER — PATIENT MESSAGE (OUTPATIENT)
Dept: OBSTETRICS AND GYNECOLOGY | Facility: CLINIC | Age: 29
End: 2019-01-09

## 2019-01-09 ENCOUNTER — OFFICE VISIT (OUTPATIENT)
Dept: OBSTETRICS AND GYNECOLOGY | Facility: CLINIC | Age: 29
End: 2019-01-09
Attending: OBSTETRICS & GYNECOLOGY
Payer: COMMERCIAL

## 2019-01-09 VITALS
SYSTOLIC BLOOD PRESSURE: 120 MMHG | BODY MASS INDEX: 25.37 KG/M2 | WEIGHT: 152.25 LBS | HEIGHT: 65 IN | DIASTOLIC BLOOD PRESSURE: 60 MMHG

## 2019-01-09 DIAGNOSIS — N76.0 VAGINAL VESTIBULITIS: ICD-10-CM

## 2019-01-09 DIAGNOSIS — N89.8 VAGINAL IRRITATION: Primary | ICD-10-CM

## 2019-01-09 PROCEDURE — 3008F PR BODY MASS INDEX (BMI) DOCUMENTED: ICD-10-PCS | Mod: CPTII,S$GLB,, | Performed by: OBSTETRICS & GYNECOLOGY

## 2019-01-09 PROCEDURE — 87480 CANDIDA DNA DIR PROBE: CPT

## 2019-01-09 PROCEDURE — 3008F BODY MASS INDEX DOCD: CPT | Mod: CPTII,S$GLB,, | Performed by: OBSTETRICS & GYNECOLOGY

## 2019-01-09 PROCEDURE — 99214 PR OFFICE/OUTPT VISIT, EST, LEVL IV, 30-39 MIN: ICD-10-PCS | Mod: S$GLB,,, | Performed by: OBSTETRICS & GYNECOLOGY

## 2019-01-09 PROCEDURE — 87510 GARDNER VAG DNA DIR PROBE: CPT

## 2019-01-09 PROCEDURE — 99214 OFFICE O/P EST MOD 30 MIN: CPT | Mod: S$GLB,,, | Performed by: OBSTETRICS & GYNECOLOGY

## 2019-01-09 NOTE — PROGRESS NOTES
"SUBJECTIVE:   28 y.o. female  with history of vestibulitis presents today with complaints of vagina irritation. . Patient's last menstrual period was 2019..  She reports that she had been doing well until the beginning of December. She reports "very different feeling." She reports feels "raw". She reports itching, swelling and hurts to touch." She denies vaginal discharge. She will be moving to [place] in .        History reviewed. No pertinent past medical history.  History reviewed. No pertinent surgical history.  Social History     Socioeconomic History    Marital status: Single     Spouse name: Not on file    Number of children: Not on file    Years of education: Not on file    Highest education level: Not on file   Social Needs    Financial resource strain: Not on file    Food insecurity - worry: Not on file    Food insecurity - inability: Not on file    Transportation needs - medical: Not on file    Transportation needs - non-medical: Not on file   Occupational History    Not on file   Tobacco Use    Smoking status: Never Smoker    Smokeless tobacco: Never Used   Substance and Sexual Activity    Alcohol use: Yes     Comment: socially    Drug use: No    Sexual activity: Yes     Partners: Male     Birth control/protection: Condom   Other Topics Concern    Not on file   Social History Narrative    Not on file     Family History   Problem Relation Age of Onset    Ovarian cancer Paternal Grandmother 70    Breast cancer Maternal Grandmother 70    No Known Problems Father     No Known Problems Mother     Cancer Neg Hx     Colon cancer Neg Hx      OB History    Para Term  AB Living   0 0 0 0 0 0   SAB TAB Ectopic Multiple Live Births   0 0 0 0                   Current Outpatient Medications   Medication Sig Dispense Refill    boric acid (BORIC ACID) vaginal suppository INSERT ONE SUPPOSITORY VAGINALLY EVERY NIGHT AT BEDTIME 14 suppository 3    lorazepam (ATIVAN) " 1 MG tablet Take 1 mg by mouth every 6 (six) hours as needed for Anxiety.      ranitidine (ZANTAC) 150 MG tablet Take 150 mg by mouth 2 (two) times daily.      sertraline (ZOLOFT) 100 MG tablet        No current facility-administered medications for this visit.      Allergies: Patient has no known allergies.     The ASCVD Risk score (Weston RUDY Berry., et al., 2013) failed to calculate for the following reasons:    The 2013 ASCVD risk score is only valid for ages 40 to 79      ROS:  Constitutional: no weight loss, weight gain, fever, fatigue  Eyes:  No vision changes, glasses/contacts  ENT/Mouth: No ulcers, sinus problems, ears ringing, headache  Cardiovascular: No inability to lie flat, chest pain, exercise intolerance, swelling, heart palpitations  Respiratory: No wheezing, coughing blood, shortness of breath, or cough  Gastrointestinal: No diarrhea, bloody stool, nausea/vomiting, constipation, gas, hemorrhoids  Genitourinary: No blood in urine, painful urination, urgency of urination, frequency of urination, incomplete emptying, incontinence, abnormal bleeding, painful periods, heavy periods, vaginal discharge, vaginal odor, painful intercourse, sexual problems, bleeding after intercourse.  Musculoskeletal: No muscle weakness  Skin/Breast: No painful breasts, nipple discharge, masses, rash, ulcers  Neurological: No passing out, seizures, numbness, headache  Endocrine: No diabetes, hypothyroid, hyperthyroid, hot flashes, hair loss, abnormal hair growth, acne  Psychiatric: No depression, crying  Hematologic: No bruises, bleeding, swollen lymph nodes, anemia.      Physical Exam  General- well developed, well nourished  Vulva- no masses, no lesions, +swelling, +redness  Vagina-  no masses, no lesions  Cervix- deferred  Uterus- deferred  Adnexa- deferred      ASSESSMENT:   Vaginitis  Vestibulitis    PLAN:   Follow up affirm  Counseled patient on Zinc, Vit A, Vit E and probiotic  Recommend Vitanica V fresh suppositories

## 2019-01-10 ENCOUNTER — TELEPHONE (OUTPATIENT)
Dept: OBSTETRICS AND GYNECOLOGY | Facility: CLINIC | Age: 29
End: 2019-01-10

## 2019-01-10 RX ORDER — FLUCONAZOLE 100 MG/1
TABLET ORAL
Qty: 4 TABLET | Refills: 1 | Status: SHIPPED | OUTPATIENT
Start: 2019-01-10

## 2025-05-20 NOTE — TELEPHONE ENCOUNTER
----- Message from Dieter Salinas sent at 3/27/2018 12:50 PM CDT -----  Contact: Pt  x_  1st Request  _  2nd Request  _  3rd Request        Who: MIGUE WOODY [[id number]]    Why: Requesting a call back in regards to scheduling annual.. Attempted to schedule no template available.   Please return the call at earliest convenience. Thanks!    What Number to Call Back:[phone] (home)         When to Expect a call back: (Within 24 hours)                              
Pt called to schedule appointment scheduled on 4/20  
21-May-2025